# Patient Record
Sex: FEMALE | Race: WHITE
[De-identification: names, ages, dates, MRNs, and addresses within clinical notes are randomized per-mention and may not be internally consistent; named-entity substitution may affect disease eponyms.]

---

## 2017-08-03 ENCOUNTER — HOSPITAL ENCOUNTER (EMERGENCY)
Dept: HOSPITAL 74 - JER | Age: 37
LOS: 1 days | Discharge: HOME | End: 2017-08-04
Payer: COMMERCIAL

## 2017-08-03 VITALS — SYSTOLIC BLOOD PRESSURE: 147 MMHG | DIASTOLIC BLOOD PRESSURE: 87 MMHG | HEART RATE: 82 BPM | TEMPERATURE: 98.3 F

## 2017-08-03 VITALS — BODY MASS INDEX: 28.3 KG/M2

## 2017-08-03 DIAGNOSIS — Z3A.01: ICD-10-CM

## 2017-08-03 DIAGNOSIS — O26.891: Primary | ICD-10-CM

## 2017-08-03 DIAGNOSIS — R10.30: ICD-10-CM

## 2017-08-03 LAB
ALBUMIN SERPL-MCNC: 3.9 G/DL (ref 3.4–5)
ALP SERPL-CCNC: 82 U/L (ref 45–117)
ALT SERPL-CCNC: 35 U/L (ref 12–78)
ANION GAP SERPL CALC-SCNC: 6 MMOL/L (ref 8–16)
APPEARANCE UR: (no result)
AST SERPL-CCNC: 16 U/L (ref 15–37)
BASOPHILS # BLD: 0.3 % (ref 0–2)
BILIRUB SERPL-MCNC: 0.4 MG/DL (ref 0.2–1)
BILIRUB UR STRIP.AUTO-MCNC: NEGATIVE MG/DL
CALCIUM SERPL-MCNC: 8.9 MG/DL (ref 8.5–10.1)
CO2 SERPL-SCNC: 28 MMOL/L (ref 21–32)
COLOR UR: YELLOW
CREAT SERPL-MCNC: 0.6 MG/DL (ref 0.55–1.02)
DEPRECATED RDW RBC AUTO: 12.2 % (ref 11.6–15.6)
EOSINOPHIL # BLD: 1.3 % (ref 0–4.5)
GLUCOSE SERPL-MCNC: 99 MG/DL (ref 74–106)
HYALINE CASTS URNS QL MICRO: 4 /LPF
KETONES UR QL STRIP: NEGATIVE
LEUKOCYTE ESTERASE UR QL STRIP.AUTO: (no result)
MCH RBC QN AUTO: 28.9 PG (ref 25.7–33.7)
MCHC RBC AUTO-ENTMCNC: 33.3 G/DL (ref 32–36)
MCV RBC: 86.7 FL (ref 80–96)
MUCOUS THREADS URNS QL MICRO: (no result)
NEUTROPHILS # BLD: 70 % (ref 42.8–82.8)
NITRITE UR QL STRIP: NEGATIVE
PH UR: 7 [PH] (ref 5–8)
PLATELET # BLD AUTO: 245 K/MM3 (ref 134–434)
PMV BLD: 8 FL (ref 7.5–11.1)
PROT SERPL-MCNC: 7.3 G/DL (ref 6.4–8.2)
PROT UR QL STRIP: NEGATIVE
PROT UR QL STRIP: NEGATIVE
RBC # BLD AUTO: 1 /HPF (ref 0–3)
RBC # UR STRIP: NEGATIVE /UL
UROBILINOGEN UR STRIP-MCNC: (no result) MG/DL (ref 0.2–1)
WBC # BLD AUTO: 9.5 K/MM3 (ref 4–10)
WBC # UR AUTO: 10 /HPF (ref 3–5)

## 2017-08-03 NOTE — PDOC
History of Present Illness





- General


History Source: Patient


Exam Limitations: No Limitations





- History of Present Illness


Initial Comments: 


This is a 36 yo  (about 4 weeks pregnant) female with h/o ectopic pregnancy 

this past April (treated with methotrexate x2), who presents with right lower 

abdominal pain for the past 3 days. It is radiating to the right lower back, 

fluctuating, up to 8/10 severity, and feels like a cramp. She has taken no 

medications for her symptoms. She was seen at the office of her OB doctor (Dr. Humphreys) on Tuesday, at which time she had an ultrasound which did not show any 

fetal sac, fetal pole, or yolk sac within the uterus. She then had a follow-up 

hCG today, was told the level was high, and was instructed to go to the ED to 

rule out ectopic pregnancy. She notes recent clear vaginal discharge and chills

, but denies fever, vaginal bleeding, painful urination, fever, nausea, vomiting

, diarrhea, constipation, or other recent symptoms. She denies h/o STDs and has 

no concern for STD tonight.





<Carmela Dodd - Last Filed: 17 23:27>





<Hanna Lozoya - Last Filed: 17 01:26>





- General


Chief Complaint: Pain


Stated Complaint: PCP SENT/EVALUATION


Time Seen by Provider: 17 21:18





Past History





- Psycho/Social/Smoking Cessation Hx


Suicidal Ideation: No


Smoking History: Never smoked


Information on smoking cessation initiated: No


Hx Alcohol Use: No


Drug/Substance Use Hx: No


Substance Use Type: None





<Carmela Dodd - Last Filed: 17 23:27>





<Hanna Lozoya - Last Filed: 17 01:26>





- Past Medical History


Allergies/Adverse Reactions: 


 Allergies











Allergy/AdvReac Type Severity Reaction Status Date / Time


 


No Known Allergies Allergy   Verified 17 18:46











Home Medications: 


Ambulatory Orders





Pantoprazole Sodium [Protonix -] 40 mg PO DAILY #14 tablet.ec 04/10/16 


Nitrofurantoin Monohyd/M-Cryst [Macrobid -] 100 mg PO BID #14 capsule 17 











**Review of Systems





- Review of Systems


Constitutional: Yes: Chills.  No: Fever, Unexplained wgt Loss


HEENTM: No: Nose Congestion, Throat Pain


Respiratory: No: Cough, Shortness of Breath


Cardiac (ROS): No: Chest Pain, Palpitations


ABD/GI: Yes: Abdominal cramping.  No: Constipated, Diarrhea, Nausea, Vomiting


: Yes: Other (clear vaginal discharge).  No: Burning, Dysuria


Musculoskeletal: No: Back Pain, Neck Pain


Integumentary: No: Bruising, Rash


Neurological: No: Headache, Numbness, Tingling, Weakness, Dizziness


Endocrine: No: Unexplained Weight Gain, Unexplained Weight Loss





<Carmela Dodd - Last Filed: 17 23:27>





*Physical Exam





- Vital Signs


 Last Vital Signs











Temp Pulse Resp BP Pulse Ox


 


 98.3 F   82   18   147/87   100 


 


 17 18:44  17 18:44  17 18:44  17 18:44  17 18:44














17 22:30


Blood pressure re-checked suring examination to be 110/80.





- Physical Exam


General Appearance: Yes: Nourished, Appropriately Dressed, Other (well 

appearing and nontoxic appearing, conversive, pleasant).  No: Apparent Distress


HEENT: positive: EOMI, Normal Voice, Hearing Grossly Normal.  negative: Scleral 

Icterus (R), Scleral Icterus (L), Nasal Congestion


Neck: positive: Trachea midline, Supple.  negative: Tender, Rigid


Respiratory/Chest: positive: Lungs Clear, Normal Breath Sounds.  negative: 

Respiratory Distress, Crackles, Rhonchi, Stridor, Wheezing


Cardiovascular: positive: Regular Rhythm, Regular Rate.  negative: Edema, Murmur


Female Pelvic Exam: positive: normal external exam, cervical os closed, 

discharge (moderate thin white non-odorous discharge), adnexal tenderness (

right only).  negative: CMT, lesions, vaginal bleeding


Gastrointestinal/Abdominal: positive: Normal Bowel Sounds, Tender (mild RLQ), 

Soft.  negative: Organomegaly, Pulsatile Mass, Guarding


Musculoskeletal: positive: Normal Inspection.  negative: Decreased Range of 

Motion, Vertebral Tenderness


Extremity: positive: Normal Capillary Refill, Normal Inspection, Normal Range 

of Motion.  negative: Tender, Cyanosis


Integumentary: positive: Normal Color, Dry, Warm.  negative: Erythema, Rash, 

Bruising


Neurologic: positive: CNs II-XII NML intact (grossly), Fully Oriented, Alert, 

Normal Mood/Affect, Normal Response, Motor Strength 5/5





<Carmela Dodd - Last Filed: 17 23:27>





- Vital Signs


 Last Vital Signs











Temp Pulse Resp BP Pulse Ox


 


 98.3 F   82   18   147/87   100 


 


 17 18:44  17 18:44  17 18:44  17 18:44  17 18:44














<Hanna Lozoya - Last Filed: 17 01:26>





ED Treatment Course





- LABORATORY


CBC & Chemistry Diagram: 


 17 22:12





 17 22:12





<Carmela Dodd - Last Filed: 17 23:27>





- LABORATORY


CBC & Chemistry Diagram: 


 17 22:12





 17 22:12





- ADDITIONAL ORDERS


Additional order review: 


 Laboratory  Results











  17





  22:12 22:12 22:12


 


Sodium    138


 


Potassium    3.6


 


Chloride    104


 


Carbon Dioxide    28


 


Anion Gap    6 L


 


BUN    9


 


Creatinine    0.6  D


 


Creat Clearance w eGFR    > 60


 


Random Glucose    99


 


Calcium    8.9


 


Total Bilirubin    0.4  D


 


AST    16


 


ALT    35  D


 


Alkaline Phosphatase    82


 


Total Protein    7.3


 


Albumin    3.9


 


Beta HCG, Quant   2043.7 


 


Urine Color   


 


Urine Appearance   


 


Urine pH   


 


Urine Protein   


 


Urine Glucose (UA)   


 


Urine Ketones   


 


Urine Blood   


 


Urine Nitrite   


 


Urine Bilirubin   


 


Urine Urobilinogen   


 


Ur Leukocyte Esterase   


 


Urine RBC   


 


Urine WBC   


 


Ur Epithelial Cells   


 


Hyaline Casts   


 


Urine Mucus   


 


Urine HCG, Qual   


 


Blood Type  O POSITIVE  


 


Antibody Screen  Negative  














  17





  22:04 21:55


 


Sodium  


 


Potassium  


 


Chloride  


 


Carbon Dioxide  


 


Anion Gap  


 


BUN  


 


Creatinine  


 


Creat Clearance w eGFR  


 


Random Glucose  


 


Calcium  


 


Total Bilirubin  


 


AST  


 


ALT  


 


Alkaline Phosphatase  


 


Total Protein  


 


Albumin  


 


Beta HCG, Quant  


 


Urine Color  Yellow 


 


Urine Appearance  Slcloudy 


 


Urine pH  7.0 


 


Urine Protein  Negative 


 


Urine Glucose (UA)  Negative 


 


Urine Ketones  Negative 


 


Urine Blood  Negative 


 


Urine Nitrite  Negative 


 


Urine Bilirubin  Negative 


 


Urine Urobilinogen  4.0 e.u/dl H 


 


Ur Leukocyte Esterase  3+ H 


 


Urine RBC  1 


 


Urine WBC  10 


 


Ur Epithelial Cells  Few 


 


Hyaline Casts  4 


 


Urine Mucus  Rare 


 


Urine HCG, Qual   Positive


 


Blood Type  


 


Antibody Screen  








 











  17





  22:12


 


RBC  4.53


 


MCV  86.7


 


MCHC  33.3


 


RDW  12.2


 


MPV  8.0


 


Neutrophils %  70.0


 


Lymphocytes %  22.4  D


 


Monocytes %  6.0


 


Eosinophils %  1.3


 


Basophils %  0.3














- RADIOLOGY


Radiology Studies Ordered: 














 Category Date Time Status


 


 PELVIS(OTHER) US [US] Stat Ultrasound  17 00:25 Taken


 


 TRANSVAGINAL ULTRASOUND US [US] Stat Ultrasound  17 21:40 Completed














<Hanna Lozoya - Last Filed: 17 01:26>





Medical Decision Making





- Medical Decision Making


This is a 36 yo  (4 wk pregnant) female with h/o prior ectopic who p/w RLQ 

pain.


US in OB's office on Tuesday was not able to visualize IUP.


Beta-hCG today in OB office was high.


Pt was instructed to come to the ED to r/o ectopic pregnancy.


Exam notable for RLQ ttp.


Pelvic with moderate thin white discharge, os closed, right adnexal tenderness, 

no CMT.


DDX includes ectopic pregnancy, appendicitis, round ligament pain, UTI.


Ordered are CBC, CMP, lipase, quant beta-hCG, UA/cx, US transvaginal.





17 23:27


UA with 3+ leukocyte esterase.


CBC unremarkable.


Quantitative beta-hCG is 2043.7.


Pt is sent for transvaginal US.





17 23:33








<Carmela Dodd - Last Filed: 17 23:27>





*DC/Admit/Observation/Transfer





<Carmela Dodd - Last Filed: 17 23:27>





<Hanna Lozoya - Last Filed: 17 01:26>


Diagnosis at time of Disposition: 


 Abdominal pain affecting pregnancy





- Prescriptions


Prescriptions: 


Nitrofurantoin Monohyd/M-Cryst [Macrobid -] 100 mg PO BID #14 capsule





- Referrals


Referrals: 


Davion Humphreys [Primary Care Provider] - 





- Patient Instructions


Printed Discharge Instructions:  DI for Acute Abdomen


Additional Instructions: 


Your ultrasound was inconclusive as the appendix was not visible. Please return 

at 6:30am for your MRI which is needed to rule out appendicitis. If your 

abdominal pain worsens, come back sooner.

## 2017-08-03 NOTE — PDOC
Attending Attestation





- HPI


HPI: 


The patient is a 36 yo F  with a past medical history significant for Hx of 

ectopic pregnancy who presents from OBs office complaining of 2-3 days of RLQ 

pain and positive HCG with no intrauterine pregnancy seen on US at her OBs 

office.  The patient states the pain radiates to her R lower back. She 

describes the pain as a cramping sensation. The patient states she went to her 

OBs office on Tuesday and had her US. Today, the patient tested her HCG in 

office today and found it to be elevated. Her OB sent her to the ED for 

possible ectopic pregnancy. 8/10. Radiates to RL back. Cramping. She states her 

LMP was . She notes shes been having clear vaginal discharge and 

subjective chills. Denies fevers, nausea, vomiting, diarrhea. Denies vaginal 

bleeding. Denies hematuria, frequency, urgency and dysuria. She states her last 

ectopic felt different in that it was nonstop bleeding and more painful.  She 

also experienced nausea and vomiting last time. Last BM was this morning. 





PCP: Dr. Echols








- Physicial Exam


PE: 


GENERAL: Awake, alert, and fully oriented, in no acute distress


HEAD: No signs of trauma


EYES: PERRLA, EOMI, sclera anicteric, conjunctiva clear


ENT: Auricles normal inspection, hearing grossly normal, nares patent, 

oropharynx clear without exudates. Moist mucosa


NECK: Normal ROM, supple, no lymphadenopathy, JVD, or masses


LUNGS: Breath sounds equal, clear to auscultation bilaterally.  No wheezes, and 

no crackles


HEART: Regular rate and rhythm, normal S1 and S2, no murmurs, rubs or gallops


ABDOMEN: Soft, RLQ tenderness to palpation, normoactive bowel sounds.  No 

guarding, no rebound.  No masses


EXTREMITIES: Normal range of motion, no edema.  No clubbing or cyanosis. No 

cords, erythema, or tenderness


NEUROLOGICAL: Cranial nerves II through XII grossly intact.  Normal speech, 

normal gait


SKIN: Warm, Dry, normal turgor, no rashes or lesions noted.








- Medical Decision Making


Pelvic US:


FINDINGS: The appendix is not visualized. However, there is excess bowel gas 

limiting the scan. Therefore, nonvisualization of the appendix does not exclude 

appendicitis and if appendicitis is suspected, further evaluation is 

recommended.





Documentation prepared by Christal Mixon, acting as medical scribe for Hanna Lozoya MD, MD/DO.











<Christal Mixon - Last Filed: 17 00:52>





- Resident


Resident Name: Carmela Dodd





- Medical Decision Making


17 23:00


38yo F currently 4 weeks pregnant by dates, hx ectopic pregnancy p/w 2 days of 

RLQ pain and sent in to ED by her OB 2/2 "elevated HCG." Pt also reports that 

an US on Tuesday did not show an intrauterine pregnancy. Here, her exam is 

remarkable for significant RLQ ttp but no rebound or guarding and mild R 

adnexal ttp on pelvic exam. Concern for ectopic vs appendicitis. TVUS thus far 

negative for ectopic pregnancy, and shows no R ovarian pathology. In addition, 

the US shows an IUP, On reexamination, pt remains tender in the RLQ. Spoke with 

radiology who recommends a pelvic US to see if the appendix can be visualized. 

Remainder of plan:


-labs including CBC, T&S, and HCG quant


-UA


-reassess





17 23:05


Initial 's systolic. On repeat /72, and when repeated 30 mins later 

was 104/68. UA with no protein. UA with 3+ LE, pt is asymptomatic. Will give pt 

nitrofurantoin in case of asymptomatic bacturia. 





17 01:00


Pelvic US does not visualize the appendix. Since an MRI will be needed to r/o 

appendicitis, we offered to patient admission for monitoring as well as 

consultation of the OB/GYN team in addition to an MRI in the morning. Pt does 

not want to stay in the hospital and prefers to come back in the morning for 

MRI. I discussed the possibility of her condition getting worse, especially if 

there is an appendicitis and that we could monitor her in the ED but she 

declines to stay. Vitals are stable, she is not in any distress. She agrees to 

return in the morning for MRI or at any point tonight if she changes her mind 

or she has any symptoms of worsening pain, distended abdomen, bleeding, CP, or 

any other concerning symptoms.


-DC











17 03:48








<Hanna Lozoya - Last Filed: 17 03:48>

## 2017-08-04 ENCOUNTER — HOSPITAL ENCOUNTER (EMERGENCY)
Dept: HOSPITAL 74 - JER | Age: 37
Discharge: HOME | End: 2017-08-04
Payer: COMMERCIAL

## 2017-08-04 VITALS — BODY MASS INDEX: 28.3 KG/M2

## 2017-08-04 VITALS — SYSTOLIC BLOOD PRESSURE: 107 MMHG | DIASTOLIC BLOOD PRESSURE: 66 MMHG | HEART RATE: 93 BPM

## 2017-08-04 VITALS — TEMPERATURE: 98.1 F

## 2017-08-04 DIAGNOSIS — R10.13: Primary | ICD-10-CM

## 2017-08-04 LAB
ANION GAP SERPL CALC-SCNC: 8 MMOL/L (ref 8–16)
BASOPHILS # BLD: 0.3 % (ref 0–2)
CALCIUM SERPL-MCNC: 8.8 MG/DL (ref 8.5–10.1)
CO2 SERPL-SCNC: 25 MMOL/L (ref 21–32)
CREAT SERPL-MCNC: 0.5 MG/DL (ref 0.55–1.02)
DEPRECATED RDW RBC AUTO: 12 % (ref 11.6–15.6)
EOSINOPHIL # BLD: 1 % (ref 0–4.5)
GLUCOSE SERPL-MCNC: 89 MG/DL (ref 74–106)
MCH RBC QN AUTO: 29.2 PG (ref 25.7–33.7)
MCHC RBC AUTO-ENTMCNC: 33.9 G/DL (ref 32–36)
MCV RBC: 86.2 FL (ref 80–96)
NEUTROPHILS # BLD: 76 % (ref 42.8–82.8)
PLATELET # BLD AUTO: 228 K/MM3 (ref 134–434)
PMV BLD: 8 FL (ref 7.5–11.1)
SP GR UR: 1.02 (ref 1–1.02)
WBC # BLD AUTO: 8.1 K/MM3 (ref 4–10)

## 2017-08-04 NOTE — PDOC
Attending Attestation





- Resident


Resident Name: Tereso Keller





- ED Attending Attestation


I have performed the following: I have examined & evaluated the patient, The 

case was reviewed & discussed with the resident, I agree w/resident's findings 

& plan, Exceptions are as noted





- HPI


HPI: 





08/04/17 09:25





37-year-old female approximate 5 weeks pregnant with no past medical history 

returns back for right lower quadrant pain. Patient was found to have a urinary 

tract infection and a transvaginal ultrasound demonstrated pregnancy at 4 weeks 

and 6 days but no fetal pole. Patient was given antibiotics and recommended for 

MRI. However, the patient did not want to stay and left and agree to return in 

the morning for an MRI. Denies fevers, nausea, vomiting, diarrhea.





- Physicial Exam


PE: 





08/04/17 09:33





GENERAL: Awake, alert, and fully oriented, in no acute distress. 


HEAD: No signs of trauma


EYES: PERRLA, EOMI, sclera anicteric, conjunctiva clear


ENT: Auricles normal inspection, hearing grossly normal, nares patent, 

oropharynx clear without exudates.  


NECK: Normal ROM, supple, no lymphadenopathy, JVD, or masses


LUNGS: Breath sounds equal, clear to auscultation bilaterally.  No wheezes, and 

no crackles


HEART: Regular rate and rhythm, normal S1 and S2, no murmurs, rubs or gallops


ABDOMEN: TTP RLQ. Soft, normoactive bowel sounds.  No guarding, no rebound.  No 

masses


EXTREMITIES: Normal range of motion, no edema.  No clubbing or cyanosis. No 

cords, erythema, or tenderness


NEUROLOGICAL: Cranial nerves II through XII grossly intact.  Normal speech, 

normal gait


SKIN: Warm, Dry, normal turgor, no rashes or lesions noted.





- Medical Decision Making


08/04/17 09:33





 Vital Signs











Temp Pulse Resp BP Pulse Ox


 


 98.1 F   73   18   131/78   100 


 


 08/04/17 07:55  08/04/17 07:55  08/04/17 07:55  08/04/17 07:55  08/04/17 07:55








Repeat beta. I agree reply with MRI. Abdomen pelvis to rule out appendicitis. 





08/04/17 13:04





 CBC, BMP





 08/04/17 09:09 





 08/04/17 09:09 





 CMP











Sodium  138 mmol/L (136-145)   08/04/17  09:09    


 


Potassium  3.4 mmol/L (3.5-5.1)  L  08/04/17  09:09    


 


Chloride  105 mmol/L ()   08/04/17  09:09    


 


Carbon Dioxide  25 mmol/L (21-32)   08/04/17  09:09    


 


Anion Gap  8  (8-16)   08/04/17  09:09    


 


BUN  7 mg/dL (7-18)  D 08/04/17  09:09    


 


Creatinine  0.5 mg/dL (0.55-1.02)  L  08/04/17  09:09    


 


Random Glucose  89 mg/dL ()   08/04/17  09:09    


 


Calcium  8.8 mg/dL (8.5-10.1)   08/04/17  09:09    








MRI reviewed. No appendicitis.


Likely cystitis.

## 2017-08-04 NOTE — PDOC
*Physical Exam





- Vital Signs


Pt's course was signed out to me by Dr. Kaba. Pt is a 37F , who is 4 

weeks pregnant, presenting with RLQ abdominal pain. Pt has no fever, no 

leukocytosis, no vaginal bleeding. transvaginal US showed intrauterine 

pregnancy. Pelvic exam demonstrated adnexal tenderness. US vs. MRI to r/o 

appendicitis. Pt's UA consistent with UTI, pt is asymptomatic, treated with 

abx. 











 Last Vital Signs











Temp Pulse Resp BP Pulse Ox


 


 98.3 F   82   18   147/87   100 


 


 17 18:44  17 18:44  17 18:44  17 18:44  17 18:44














17 00:26








ED Treatment Course





- LABORATORY


CBC & Chemistry Diagram: 


 17 22:12





 17 22:12





- ADDITIONAL ORDERS


Additional order review: 


 Laboratory  Results











  17





  22:12 22:12 22:12


 


Sodium    138


 


Potassium    3.6


 


Chloride    104


 


Carbon Dioxide    28


 


Anion Gap    6 L


 


BUN    9


 


Creatinine    0.6  D


 


Creat Clearance w eGFR    > 60


 


Random Glucose    99


 


Calcium    8.9


 


Total Bilirubin    0.4  D


 


AST    16


 


ALT    35  D


 


Alkaline Phosphatase    82


 


Total Protein    7.3


 


Albumin    3.9


 


Beta HCG, Quant   2043.7 


 


Urine Color   


 


Urine Appearance   


 


Urine pH   


 


Urine Protein   


 


Urine Glucose (UA)   


 


Urine Ketones   


 


Urine Blood   


 


Urine Nitrite   


 


Urine Bilirubin   


 


Urine Urobilinogen   


 


Ur Leukocyte Esterase   


 


Urine RBC   


 


Urine WBC   


 


Ur Epithelial Cells   


 


Hyaline Casts   


 


Urine Mucus   


 


Urine HCG, Qual   


 


Blood Type  O POSITIVE  


 


Antibody Screen  Negative  














  17





  22:04 21:55


 


Sodium  


 


Potassium  


 


Chloride  


 


Carbon Dioxide  


 


Anion Gap  


 


BUN  


 


Creatinine  


 


Creat Clearance w eGFR  


 


Random Glucose  


 


Calcium  


 


Total Bilirubin  


 


AST  


 


ALT  


 


Alkaline Phosphatase  


 


Total Protein  


 


Albumin  


 


Beta HCG, Quant  


 


Urine Color  Yellow 


 


Urine Appearance  Slcloudy 


 


Urine pH  7.0 


 


Urine Protein  Negative 


 


Urine Glucose (UA)  Negative 


 


Urine Ketones  Negative 


 


Urine Blood  Negative 


 


Urine Nitrite  Negative 


 


Urine Bilirubin  Negative 


 


Urine Urobilinogen  4.0 e.u/dl H 


 


Ur Leukocyte Esterase  3+ H 


 


Urine RBC  1 


 


Urine WBC  10 


 


Ur Epithelial Cells  Few 


 


Hyaline Casts  4 


 


Urine Mucus  Rare 


 


Urine HCG, Qual   Positive


 


Blood Type  


 


Antibody Screen  








 











  17





  22:12


 


RBC  4.53


 


MCV  86.7


 


MCHC  33.3


 


RDW  12.2


 


MPV  8.0


 


Neutrophils %  70.0


 


Lymphocytes %  22.4  D


 


Monocytes %  6.0


 


Eosinophils %  1.3


 


Basophils %  0.3














*DC/Admit/Observation/Transfer


Diagnosis at time of Disposition: 


 Abdominal pain affecting pregnancy





- Discharge Dispostion


Disposition: HOME


Admit: No





- Prescriptions


Prescriptions: 


Nitrofurantoin Monohyd/M-Cryst [Macrobid -] 100 mg PO BID #14 capsule





- Patient Instructions


Printed Discharge Instructions:  DI for Acute Abdomen


Additional Instructions: 


Your ultrasound was inconclusive as the appendix was not visible. Please return 

at 6:30am for your MRI which is needed to rule out appendicitis. If your 

abdominal pain worsens, come back sooner.





- Attestations


Physician Attestion: 





17 01:09








I, Dr. Gilberto Reyna, attest that this document has been prepared under my 

direction and personally reviewed by me in its entirety.   I further attest, 

that it accurately reflects all work, treatment, procedures and medical decision

-making performed by me.

## 2017-08-04 NOTE — PDOC
History of Present Illness





- General


Chief Complaint: Pain, Acute


Stated Complaint: REVISIT


Time Seen by Provider: 17 08:09





- History of Present Illness


Initial Comments: 


17 08:09


Ms. Brunner is a 38 yo female  with a significant past medical history of 

ectopic pregnancy who presented to the emergency department from OB's office 

yesterday with 2-3 days of RLQ pain. She was referred to ER by OB after 

positive HCG w/out intrauterine pregnancy seen on US at OB's office. She says 

the pain radiates to her lower back (R) and that it is crampy. She describes 

her pain as 9/10. LMP , she notes she has been having clear vaginal 

discharge w/ subjective chills. She had a full workup last night which was able 

to confirm an intrauterine sac (no pole noted) with transvaginal US. Pelvic US 

was not able to r/o appendicitis and she was recommended for admission with 

MRI. Elected to go home and represents this AM for same.





The patient denies chest pain, shortness of breath, headache and dizziness. 

Denies fever, nausea, vomit, diarrhea and constipation. Denies dysuria, 

frequency, urgency and hematuria.





Allergies: NKDA


Past surgical history: None


Social history: Social EtOH


PMD - Dr. Wheat


OB/GYN - Dr. Tomlin











17 08:46








Past History





- Past Medical History


Allergies/Adverse Reactions: 


 Allergies











Allergy/AdvReac Type Severity Reaction Status Date / Time


 


No Known Allergies Allergy   Verified 17 07:55











Home Medications: 


Ambulatory Orders





Nitrofurantoin Monohyd/M-Cryst [Macrobid -] 100 mg PO BID #14 capsule 17 











- Immunization History


Immunization Up to Date: Yes





- Psycho/Social/Smoking Cessation Hx


Suicidal Ideation: No


Smoking History: Never smoked


Hx Alcohol Use: No


Drug/Substance Use Hx: No


Substance Use Type: None





**Review of Systems





- Review of Systems


Comments:: 


17 08:09


GENERAL/CONSTITUTIONAL: +Subjective chills. No fever. No weakness.


HEAD, EYES, EARS, NOSE AND THROAT: No change in vision. No ear pain or 

discharge. No sore throat.


CARDIOVASCULAR: No chest pain or shortness of breath


RESPIRATORY: No cough, wheezing, or hemoptysis.


GASTROINTESTINAL: +RLQ pain radiating to her back. No nausea, vomiting, 

diarrhea or constipation.


GENITOURINARY: No dysuria, frequency, or change in urination.


MUSCULOSKELETAL: No joint or muscle swelling or pain. No neck or back pain.


SKIN: No rash


NEUROLOGIC: No headache, vertigo, loss of consciousness, or change in strength/

sensation.


ENDOCRINE: No increased thirst. No abnormal weight change


HEMATOLOGIC/LYMPHATIC: No anemia, easy bleeding, or history of blood clots.


ALLERGIC/IMMUNOLOGIC: No hives or skin allergy.











*Physical Exam





- Vital Signs


 Last Vital Signs











Temp Pulse Resp BP Pulse Ox


 


 98.1 F   73   18   131/78   100 


 


 17 07:55  17 07:55  17 07:55  17 07:55  17 07:55














- Physical Exam


Comments: 


17 08:09


GENERAL: Awake, alert, and fully oriented, in no acute distress


HEAD: No signs of trauma, normocephalic, atraumatic


EYES: PERRLA, EOMI, sclera anicteric, conjunctiva clear


ENT: Auricles normal inspection, hearing grossly normal, nares patent, 

oropharynx clear without


exudates. Moist mucosa


NECK: Normal ROM, supple, no lymphadenopathy, JVD, or masses


LUNGS: No distress, speaks full sentences, clear to auscultation bilaterally


HEART: Regular rate and rhythm, normal S1 and S2, no murmurs, rubs or gallops, 

peripheral pulses normal and equal bilaterally.


ABDOMEN: +Achey and Tender in RLQ. No rebound. Soft, normoactive bowel sounds.  

No guarding, no rebound.  No masses


EXTREMITIES: Normal inspection, Normal range of motion, no edema.  No clubbing 

or cyanosis.


NEUROLOGICAL: Cranial nerves II through XII grossly intact.  Normal speech, 

normal gait, no focal sensorimotor deficits


SKIN: Warm, Dry, normal turgor, no rashes or lesions noted.








17 13:23








ED Treatment Course





- LABORATORY


CBC & Chemistry Diagram: 


 17 09:09





 17 09:09





Medical Decision Making





- Medical Decision Making


17 09:29


Ms. Brunner presents for F/U after leaving ED early this AM (0100) with 

suspected appendicitis.





MRI ordered to confirm.





17 13:22


MRI negative for appendicitis, labs unconcerning. Instructed patient to f/u 

outpatient with OB/GYN and continue ABX.











*DC/Admit/Observation/Transfer


Diagnosis at time of Disposition: 


 Epigastric abdominal pain





- Discharge Dispostion


Disposition: HOME


Condition at time of disposition: Stable





- Patient Instructions


Additional Instructions: 


Please return immediately if any increase in pain, fever, or other concerning 

symptoms.





- Attestations


Physician Attestion: 





17 13:23








I, Dr. Tereso Keller, attest that this document has been prepared under my 

direction and personally reviewed by me in its entirety.   I further attest, 

that it accurately reflects all work, treatment, procedures and medical decision

-making performed by me.

## 2017-12-16 ENCOUNTER — HOSPITAL ENCOUNTER (EMERGENCY)
Dept: HOSPITAL 74 - JER | Age: 37
Discharge: HOME | End: 2017-12-16
Payer: COMMERCIAL

## 2017-12-16 VITALS — BODY MASS INDEX: 29.2 KG/M2

## 2017-12-16 VITALS — TEMPERATURE: 98.4 F | HEART RATE: 103 BPM | SYSTOLIC BLOOD PRESSURE: 106 MMHG | DIASTOLIC BLOOD PRESSURE: 66 MMHG

## 2017-12-16 DIAGNOSIS — J06.9: ICD-10-CM

## 2017-12-16 DIAGNOSIS — B34.9: ICD-10-CM

## 2017-12-16 DIAGNOSIS — Z3A.24: ICD-10-CM

## 2017-12-16 DIAGNOSIS — O98.512: Primary | ICD-10-CM

## 2017-12-16 LAB
ALBUMIN SERPL-MCNC: 3.1 G/DL (ref 3.4–5)
ALP SERPL-CCNC: 84 U/L (ref 45–117)
ALT SERPL-CCNC: 39 U/L (ref 12–78)
ANION GAP SERPL CALC-SCNC: 10 MMOL/L (ref 8–16)
APPEARANCE UR: (no result)
AST SERPL-CCNC: 24 U/L (ref 15–37)
BACTERIA #/AREA URNS HPF: (no result) /HPF
BASOPHILS # BLD: 0.4 % (ref 0–2)
BILIRUB SERPL-MCNC: 0.3 MG/DL (ref 0.2–1)
BILIRUB UR STRIP.AUTO-MCNC: NEGATIVE MG/DL
CALCIUM SERPL-MCNC: 8.4 MG/DL (ref 8.5–10.1)
CK SERPL-CCNC: 48 IU/L (ref 26–192)
CO2 SERPL-SCNC: 24 MMOL/L (ref 21–32)
COLOR UR: YELLOW
CREAT SERPL-MCNC: 0.5 MG/DL (ref 0.55–1.02)
DEPRECATED RDW RBC AUTO: 13 % (ref 11.6–15.6)
EOSINOPHIL # BLD: 2.4 % (ref 0–4.5)
GLUCOSE SERPL-MCNC: 74 MG/DL (ref 74–106)
KETONES UR QL STRIP: (no result)
LEUKOCYTE ESTERASE UR QL STRIP.AUTO: (no result)
MCH RBC QN AUTO: 29.3 PG (ref 25.7–33.7)
MCHC RBC AUTO-ENTMCNC: 33.8 G/DL (ref 32–36)
MCV RBC: 86.7 FL (ref 80–96)
MUCOUS THREADS URNS QL MICRO: (no result)
NEUTROPHILS # BLD: 82.7 % (ref 42.8–82.8)
NITRITE UR QL STRIP: NEGATIVE
PH UR: 6 [PH] (ref 5–8)
PLATELET # BLD AUTO: 181 K/MM3 (ref 134–434)
PMV BLD: 9.2 FL (ref 7.5–11.1)
PROT SERPL-MCNC: 6.7 G/DL (ref 6.4–8.2)
PROT UR QL STRIP: (no result)
PROT UR QL STRIP: NEGATIVE
RBC # UR STRIP: NEGATIVE /UL
RBC #/AREA URNS HPF: <1 /HPF (ref 0–3)
SP GR UR: 1.02 (ref 1–1.03)
TROPONIN I SERPL-MCNC: < 0.02 NG/ML (ref 0–0.05)
UROBILINOGEN UR STRIP-MCNC: NEGATIVE MG/DL (ref 0.2–1)
WBC # BLD AUTO: 10.4 K/MM3 (ref 4–10)
WBC #/AREA URNS HPF: 1 /HPF (ref 3–5)

## 2017-12-16 PROCEDURE — 3E0337Z INTRODUCTION OF ELECTROLYTIC AND WATER BALANCE SUBSTANCE INTO PERIPHERAL VEIN, PERCUTANEOUS APPROACH: ICD-10-PCS | Performed by: EMERGENCY MEDICINE

## 2017-12-16 PROCEDURE — 3E033GC INTRODUCTION OF OTHER THERAPEUTIC SUBSTANCE INTO PERIPHERAL VEIN, PERCUTANEOUS APPROACH: ICD-10-PCS | Performed by: EMERGENCY MEDICINE

## 2017-12-16 PROCEDURE — 3E033NZ INTRODUCTION OF ANALGESICS, HYPNOTICS, SEDATIVES INTO PERIPHERAL VEIN, PERCUTANEOUS APPROACH: ICD-10-PCS | Performed by: EMERGENCY MEDICINE

## 2017-12-16 NOTE — PDOC
History of Present Illness





- General


Chief Complaint: Cold Symptoms


Stated Complaint: TROUBLE BREATHING/24 WKS PREG


Time Seen by Provider: 12/16/17 16:22





- History of Present Illness


Initial Comments: 





12/16/17 16:39


37 F @ 24 weeks presenting with cough and sore throat x 2 days. Pt denies F/C. 

Pt denies any significant chest pain or SOB other than when she has a coughing 

fit. At rest she currently denies CP/SOB. Denies pleuritic or exertional chest 

pain. Pt denies any asymmetric leg swelling. Endorses occasional nausea and 

vomiting, no diarrhea or abdominal pain. No dysuria. No vaginal bleeding or 

discharge.


Pt was seen at urgent care earlier today and had a negative flu swab and strep 

test. She was referred to ER due to tachycardia.


Pt states that her OB, Dr. Belle, instructed her to come to ER.


I spoke with Dr. Belle, who expressed his concern over pt's tachycardia in 

urgent care. He recommends non-stress testing after ED work up.








Past History





- Past Medical History


Allergies/Adverse Reactions: 


 Allergies











Allergy/AdvReac Type Severity Reaction Status Date / Time


 


No Known Allergies Allergy   Verified 12/16/17 15:08











Home Medications: 


Ambulatory Orders





Nitrofurantoin Monohyd/M-Cryst [Macrobid -] 100 mg PO BID #14 capsule 08/04/17 








COPD: No





- Immunization History


Immunization Up to Date: Yes





- Suicide/Smoking/Psychosocial Hx


Smoking History: Never smoked


Hx Alcohol Use: No


Drug/Substance Use Hx: No


Substance Use Type: None





**Review of Systems





- Review of Systems


Comments:: 





12/16/17 16:44


"GENERAL/CONSTITUTIONAL: No fever or chills. No weakness.


HEAD, EYES, EARS, NOSE AND THROAT: +sore throat. No change in vision. No ear 

pain or discharge.


CARDIOVASCULAR: No chest pain or shortness of breath.


RESPIRATORY: + cough, no wheezing, or hemoptysis.


GASTROINTESTINAL: No nausea, vomiting, diarrhea or constipation.


GENITOURINARY: No dysuria, frequency, or change in urination.


MUSCULOSKELETAL: No joint or muscle swelling or pain. No neck or back pain.


SKIN: No rash


NEUROLOGIC: No headache, vertigo, loss of consciousness, or change in strength/

sensation.


ENDOCRINE: No increased thirst. No abnormal weight change.


HEMATOLOGIC/LYMPHATIC: No anemia, easy bleeding, or history of blood clots.


ALLERGIC/IMMUNOLOGIC: No hives or skin allergy.


"





*Physical Exam





- Vital Signs


 Last Vital Signs











Temp Pulse Resp BP Pulse Ox


 


 98.9 F   124 H  20   117/86   98 


 


 12/16/17 15:05  12/16/17 15:05  12/16/17 15:05  12/16/17 15:05  12/16/17 15:05














- Physical Exam


Comments: 





12/16/17 16:44


"GENERAL: Awake, alert, and fully oriented, in no acute distress


HEAD: No signs of trauma


EYES: PERRLA, EOMI, sclera anicteric, conjunctiva clear


ENT: Auricles normal inspection, hearing grossly normal, nares patent, 

oropharynx clear without exudates. Moist mucosa


NECK: Nontender, no stepoffs, Normal ROM, supple, no lymphadenopathy, JVD, or 

masses


LUNGS: Breath sounds equal, clear to auscultation bilaterally.  No wheezes, and 

no crackles


HEART: Regular rate and rhythm, normal S1 and S2, no murmurs, rubs or gallops


ABDOMEN: Soft, gravid, nontender, normoactive bowel sounds.  No guarding, no 

rebound.  No masses


EXTREMITIES: Normal range of motion, no edema.  No clubbing or cyanosis. No 

cords, erythema, or tenderness


NEUROLOGICAL: Cranial nerves II through XII intact. 5/5 strength and sensation 

in all extremities, Normal speech, normal gait


SKIN: Warm, Dry, normal turgor, no rashes or lesions noted.


"





**Heart Score/ECG Review





- ECG Impressions


Comment:: 





12/16/17 17:16


NSR, no NEEL/STDs, , intervals wnl, axis wnl





ED Treatment Course





- LABORATORY


CBC & Chemistry Diagram: 


 12/16/17 17:05





 12/16/17 17:05





Medical Decision Making





- Medical Decision Making





12/16/17 16:45


37 F @ 24 weeks presenting with URI-like symptoms, sent from urgent care to ER 

for tachycardia. Pt with  in ER. Pt with no clinical signs or symptoms of 

DVT and no pleuritic CP or SOB. Pt likely volume depleted 2/2 viral illness and 

vomiting.


- IVF, zofran, tylenol


- Reassess, repeat vitals





12/16/17 18:12


CBC,CMP











WBC  10.4 K/mm3 (4.0-10.0)  H  12/16/17  17:05    


 


RBC  4.28 M/mm3 (3.60-5.2)   12/16/17  17:05    


 


Hgb  12.5 GM/dL (10.7-15.3)   12/16/17  17:05    


 


Hct  37.1 % (32.4-45.2)   12/16/17  17:05    


 


MCV  86.7 fl (80-96)   12/16/17  17:05    


 


MCH  29.3 pg (25.7-33.7)   12/16/17  17:05    


 


MCHC  33.8 g/dl (32.0-36.0)   12/16/17  17:05    


 


RDW  13.0 % (11.6-15.6)   12/16/17  17:05    


 


Plt Count  181 K/MM3 (134-434)  D 12/16/17  17:05    


 


MPV  9.2 fl (7.5-11.1)  D 12/16/17  17:05    


 


Neutrophils %  82.7 % (42.8-82.8)   12/16/17  17:05    


 


Lymphocytes %  8.6 % (8-40)  D 12/16/17  17:05    


 


Monocytes %  5.9 % (3.8-10.2)   12/16/17  17:05    


 


Eosinophils %  2.4 % (0-4.5)  D 12/16/17  17:05    


 


Basophils %  0.4 % (0-2.0)   12/16/17  17:05    


 


Sodium  137 mmol/L (136-145)   12/16/17  17:05    


 


Potassium  3.4 mmol/L (3.5-5.1)  L  12/16/17  17:05    


 


Chloride  103 mmol/L ()   12/16/17  17:05    


 


Carbon Dioxide  24 mmol/L (21-32)   12/16/17  17:05    


 


Anion Gap  10  (8-16)   12/16/17  17:05    


 


BUN  5 mg/dL (7-18)  L D 12/16/17  17:05    


 


Creatinine  0.5 mg/dL (0.55-1.02)  L  12/16/17  17:05    


 


Creat Clearance w eGFR  > 60  (>60)   12/16/17  17:05    


 


Random Glucose  74 mg/dL ()   12/16/17  17:05    


 


Calcium  8.4 mg/dL (8.5-10.1)  L  12/16/17  17:05    


 


Total Bilirubin  0.3 mg/dL (0.2-1.0)  D 12/16/17  17:05    


 


AST  24 U/L (15-37)  D 12/16/17  17:05    


 


ALT  39 U/L (12-78)   12/16/17  17:05    


 


Alkaline Phosphatase  84 U/L ()   12/16/17  17:05    


 


Creatine Kinase  48 IU/L ()   12/16/17  17:05    


 


Troponin I  < 0.02 ng/ml (0.00-0.05)   12/16/17  17:05    


 


Total Protein  6.7 g/dl (6.4-8.2)   12/16/17  17:05    


 


Albumin  3.1 g/dl (3.4-5.0)  L D 12/16/17  17:05    


 


Lipase  221 U/L ()   12/16/17  17:05    











Pt reassessed - now has HR <100. Pt able to tolerate PO. Denies any chest pain 

or shortness of breath.





Pt is well appearing with normal vitals. Clinically stable for DC at this time.


Will send pt to L&D for fetal heart monitoring.





*DC/Admit/Observation/Transfer


Diagnosis at time of Disposition: 


 Viral syndrome








- Discharge Dispostion


Disposition: HOME





- Referrals


Referrals: 


Jayson Solorio MD [Primary Care Provider] - 





- Patient Instructions


Printed Discharge Instructions:  DI for Viral Upper Respiratory Infection -- 

Adult


Additional Instructions: 


Please go directly to the labor and delivery floor for an evaluation of your 

baby.


You likely have a viral illness. However, if you experience worsening symptoms, 

symptoms persisting more than 4 days, chest pain, difficulty breathing, or any 

other concerning symptoms, return to the ER immediately.


Otherwise follow up with Dr. Belle this week for a re-evaluation.





- Post Discharge Activity





- Attestations


Physician Attestion: 





12/16/17 18:14








I, Dr. Miguel Mclaughlin MD, attest that this document has been prepared under my 

direction and personally reviewed by me in its entirety.   I further attest, 

that it accurately reflects all work, treatment, procedures and medical decision

-making performed by me.

## 2017-12-19 NOTE — EKG
Test Reason : 

Blood Pressure : ***/*** mmHG

Vent. Rate : 100 BPM     Atrial Rate : 100 BPM

   P-R Int : 128 ms          QRS Dur : 078 ms

    QT Int : 360 ms       P-R-T Axes : 026 070 011 degrees

   QTc Int : 464 ms

 

NORMAL SINUS RHYTHM

NORMAL ECG

NO PREVIOUS ECGS AVAILABLE

Confirmed by SHREYAS GOODWIN MD ([id number]) on 12/19/2017 1:40:42 AM

 

Referred By:             Confirmed By:SHREYAS GOODWIN MD

## 2020-11-10 ENCOUNTER — HOSPITAL ENCOUNTER (INPATIENT)
Dept: HOSPITAL 74 - JER | Age: 40
LOS: 2 days | Discharge: HOME | DRG: 263 | End: 2020-11-12
Attending: FAMILY MEDICINE | Admitting: GENERAL ACUTE CARE HOSPITAL
Payer: COMMERCIAL

## 2020-11-10 VITALS — BODY MASS INDEX: 27.3 KG/M2

## 2020-11-10 DIAGNOSIS — K80.00: Primary | ICD-10-CM

## 2020-11-10 DIAGNOSIS — K76.0: ICD-10-CM

## 2020-11-10 LAB
ALBUMIN SERPL-MCNC: 4 G/DL (ref 3.4–5)
ALP SERPL-CCNC: 99 U/L (ref 45–117)
ALT SERPL-CCNC: 35 U/L (ref 13–61)
ANION GAP SERPL CALC-SCNC: 6 MMOL/L (ref 8–16)
APPEARANCE UR: CLEAR
APTT BLD: 33.9 SECONDS (ref 25.2–36.5)
AST SERPL-CCNC: 15 U/L (ref 15–37)
BACTERIA # UR AUTO: 119 /UL (ref 0–1359)
BASOPHILS # BLD: 0.7 % (ref 0–2)
BILIRUB SERPL-MCNC: 0.2 MG/DL (ref 0.2–1)
BILIRUB UR STRIP.AUTO-MCNC: NEGATIVE MG/DL
BUN SERPL-MCNC: 9.5 MG/DL (ref 7–18)
CALCIUM SERPL-MCNC: 9.2 MG/DL (ref 8.5–10.1)
CASTS URNS QL MICRO: 1 /UL (ref 0–3.1)
CHLORIDE SERPL-SCNC: 107 MMOL/L (ref 98–107)
CO2 SERPL-SCNC: 26 MMOL/L (ref 21–32)
COLOR UR: (no result)
CREAT SERPL-MCNC: 0.6 MG/DL (ref 0.55–1.3)
DEPRECATED RDW RBC AUTO: 12.1 % (ref 11.6–15.6)
EOSINOPHIL # BLD: 2.2 % (ref 0–4.5)
EPITH CASTS URNS QL MICRO: 21 /UL (ref 0–25.1)
GLUCOSE SERPL-MCNC: 107 MG/DL (ref 74–106)
HCT VFR BLD CALC: 39.4 % (ref 32.4–45.2)
HGB BLD-MCNC: 13.1 GM/DL (ref 10.7–15.3)
INR BLD: 1.19 (ref 0.83–1.09)
KETONES UR QL STRIP: NEGATIVE
LEUKOCYTE ESTERASE UR QL STRIP.AUTO: (no result)
LIPASE SERPL-CCNC: 249 U/L (ref 73–393)
LYMPHOCYTES # BLD: 22.3 % (ref 8–40)
MCH RBC QN AUTO: 28.2 PG (ref 25.7–33.7)
MCHC RBC AUTO-ENTMCNC: 33.1 G/DL (ref 32–36)
MCV RBC: 85.2 FL (ref 80–96)
MONOCYTES # BLD AUTO: 5.2 % (ref 3.8–10.2)
NEUTROPHILS # BLD: 69.6 % (ref 42.8–82.8)
NITRITE UR QL STRIP: NEGATIVE
PH UR: 7.5 [PH] (ref 5–8)
PLATELET # BLD AUTO: 299 K/MM3 (ref 134–434)
PMV BLD: 7.4 FL (ref 7.5–11.1)
POTASSIUM SERPLBLD-SCNC: 4 MMOL/L (ref 3.5–5.1)
PROT SERPL-MCNC: 7.6 G/DL (ref 6.4–8.2)
PROT UR QL STRIP: (no result)
PROT UR QL STRIP: NEGATIVE
PT PNL PPP: 14.3 SEC (ref 9.7–13)
RBC # BLD AUTO: 4.62 M/MM3 (ref 3.6–5.2)
RBC # BLD AUTO: 4077 /UL (ref 0–23.9)
SODIUM SERPL-SCNC: 138 MMOL/L (ref 136–145)
SP GR UR: 1.02 (ref 1.01–1.03)
UROBILINOGEN UR STRIP-MCNC: 0.2 MG/DL (ref 0.2–1)
WBC # BLD AUTO: 7.6 K/MM3 (ref 4–10)
WBC # UR AUTO: 66 /UL (ref 0–25.8)

## 2020-11-10 PROCEDURE — C9803 HOPD COVID-19 SPEC COLLECT: HCPCS

## 2020-11-10 PROCEDURE — U0003 INFECTIOUS AGENT DETECTION BY NUCLEIC ACID (DNA OR RNA); SEVERE ACUTE RESPIRATORY SYNDROME CORONAVIRUS 2 (SARS-COV-2) (CORONAVIRUS DISEASE [COVID-19]), AMPLIFIED PROBE TECHNIQUE, MAKING USE OF HIGH THROUGHPUT TECHNOLOGIES AS DESCRIBED BY CMS-2020-01-R: HCPCS

## 2020-11-11 LAB
ALBUMIN SERPL-MCNC: 3.4 G/DL (ref 3.4–5)
ALP SERPL-CCNC: 76 U/L (ref 45–117)
ALT SERPL-CCNC: 30 U/L (ref 13–61)
ANION GAP SERPL CALC-SCNC: 6 MMOL/L (ref 8–16)
AST SERPL-CCNC: 16 U/L (ref 15–37)
BASOPHILS # BLD: 0.7 % (ref 0–2)
BILIRUB SERPL-MCNC: 0.6 MG/DL (ref 0.2–1)
BUN SERPL-MCNC: 4.4 MG/DL (ref 7–18)
CALCIUM SERPL-MCNC: 8.7 MG/DL (ref 8.5–10.1)
CHLORIDE SERPL-SCNC: 110 MMOL/L (ref 98–107)
CO2 SERPL-SCNC: 24 MMOL/L (ref 21–32)
CREAT SERPL-MCNC: 0.6 MG/DL (ref 0.55–1.3)
DEPRECATED RDW RBC AUTO: 12.3 % (ref 11.6–15.6)
EOSINOPHIL # BLD: 2.7 % (ref 0–4.5)
GLUCOSE SERPL-MCNC: 88 MG/DL (ref 74–106)
HCT VFR BLD CALC: 38.3 % (ref 32.4–45.2)
HGB BLD-MCNC: 12.6 GM/DL (ref 10.7–15.3)
LYMPHOCYTES # BLD: 27.3 % (ref 8–40)
MCH RBC QN AUTO: 28.3 PG (ref 25.7–33.7)
MCHC RBC AUTO-ENTMCNC: 32.9 G/DL (ref 32–36)
MCV RBC: 85.8 FL (ref 80–96)
MONOCYTES # BLD AUTO: 5.5 % (ref 3.8–10.2)
NEUTROPHILS # BLD: 63.8 % (ref 42.8–82.8)
PLATELET # BLD AUTO: 266 K/MM3 (ref 134–434)
PMV BLD: 7.8 FL (ref 7.5–11.1)
POTASSIUM SERPLBLD-SCNC: 3.6 MMOL/L (ref 3.5–5.1)
PROT SERPL-MCNC: 6.7 G/DL (ref 6.4–8.2)
RBC # BLD AUTO: 4.46 M/MM3 (ref 3.6–5.2)
SODIUM SERPL-SCNC: 140 MMOL/L (ref 136–145)
WBC # BLD AUTO: 6.3 K/MM3 (ref 4–10)

## 2020-11-11 PROCEDURE — 0FT44ZZ RESECTION OF GALLBLADDER, PERCUTANEOUS ENDOSCOPIC APPROACH: ICD-10-PCS | Performed by: SURGERY

## 2020-11-11 RX ADMIN — ACETAMINOPHEN PRN MG: 325 TABLET ORAL at 18:32

## 2020-11-11 RX ADMIN — PIPERACILLIN SODIUM,TAZOBACTAM SODIUM SCH MLS/HR: 3; .375 INJECTION, POWDER, FOR SOLUTION INTRAVENOUS at 10:23

## 2020-11-11 RX ADMIN — SODIUM CHLORIDE, POTASSIUM CHLORIDE, SODIUM LACTATE AND CALCIUM CHLORIDE SCH MLS/HR: 600; 310; 30; 20 INJECTION, SOLUTION INTRAVENOUS at 18:27

## 2020-11-11 RX ADMIN — PIPERACILLIN SODIUM,TAZOBACTAM SODIUM SCH MLS/HR: 3; .375 INJECTION, POWDER, FOR SOLUTION INTRAVENOUS at 04:52

## 2020-11-12 VITALS — HEART RATE: 74 BPM | DIASTOLIC BLOOD PRESSURE: 88 MMHG | SYSTOLIC BLOOD PRESSURE: 131 MMHG | TEMPERATURE: 98.5 F

## 2020-11-12 LAB
ALBUMIN SERPL-MCNC: 3.3 G/DL (ref 3.4–5)
ALP SERPL-CCNC: 76 U/L (ref 45–117)
ALT SERPL-CCNC: 55 U/L (ref 13–61)
ANION GAP SERPL CALC-SCNC: 7 MMOL/L (ref 8–16)
AST SERPL-CCNC: 32 U/L (ref 15–37)
BILIRUB CONJ SERPL-MCNC: 0.1 MG/DL (ref 0–0.2)
BILIRUB SERPL-MCNC: 0.5 MG/DL (ref 0.2–1)
BUN SERPL-MCNC: 5 MG/DL (ref 7–18)
CALCIUM SERPL-MCNC: 8.7 MG/DL (ref 8.5–10.1)
CHLORIDE SERPL-SCNC: 109 MMOL/L (ref 98–107)
CO2 SERPL-SCNC: 26 MMOL/L (ref 21–32)
CREAT SERPL-MCNC: 0.6 MG/DL (ref 0.55–1.3)
DEPRECATED RDW RBC AUTO: 12 % (ref 11.6–15.6)
GLUCOSE SERPL-MCNC: 87 MG/DL (ref 74–106)
HCT VFR BLD CALC: 34.3 % (ref 32.4–45.2)
HGB BLD-MCNC: 11.5 GM/DL (ref 10.7–15.3)
MCH RBC QN AUTO: 28.3 PG (ref 25.7–33.7)
MCHC RBC AUTO-ENTMCNC: 33.6 G/DL (ref 32–36)
MCV RBC: 84.4 FL (ref 80–96)
PLATELET # BLD AUTO: 252 K/MM3 (ref 134–434)
PMV BLD: 7.9 FL (ref 7.5–11.1)
POTASSIUM SERPLBLD-SCNC: 4 MMOL/L (ref 3.5–5.1)
PROT SERPL-MCNC: 6.4 G/DL (ref 6.4–8.2)
RBC # BLD AUTO: 4.06 M/MM3 (ref 3.6–5.2)
SODIUM SERPL-SCNC: 142 MMOL/L (ref 136–145)
WBC # BLD AUTO: 10.1 K/MM3 (ref 4–10)

## 2020-11-12 RX ADMIN — ACETAMINOPHEN PRN MG: 325 TABLET ORAL at 07:09

## 2020-11-12 RX ADMIN — SODIUM CHLORIDE, POTASSIUM CHLORIDE, SODIUM LACTATE AND CALCIUM CHLORIDE SCH MLS/HR: 600; 310; 30; 20 INJECTION, SOLUTION INTRAVENOUS at 06:26

## 2021-05-03 ENCOUNTER — HOSPITAL ENCOUNTER (EMERGENCY)
Dept: HOSPITAL 74 - JERFT | Age: 41
Discharge: HOME | End: 2021-05-03
Payer: COMMERCIAL

## 2021-05-03 VITALS — BODY MASS INDEX: 28.3 KG/M2

## 2021-05-03 VITALS — HEART RATE: 76 BPM | SYSTOLIC BLOOD PRESSURE: 106 MMHG | TEMPERATURE: 97.8 F | DIASTOLIC BLOOD PRESSURE: 77 MMHG

## 2021-05-03 DIAGNOSIS — M25.511: Primary | ICD-10-CM

## 2021-05-03 PROCEDURE — 3E0233Z INTRODUCTION OF ANTI-INFLAMMATORY INTO MUSCLE, PERCUTANEOUS APPROACH: ICD-10-PCS

## 2021-05-27 ENCOUNTER — HOSPITAL ENCOUNTER (OUTPATIENT)
Dept: HOSPITAL 74 - FASU | Age: 41
Discharge: HOME | End: 2021-05-27
Attending: ORTHOPAEDIC SURGERY
Payer: COMMERCIAL

## 2021-05-27 VITALS — SYSTOLIC BLOOD PRESSURE: 134 MMHG | DIASTOLIC BLOOD PRESSURE: 90 MMHG | HEART RATE: 86 BPM

## 2021-05-27 VITALS — TEMPERATURE: 97.8 F

## 2021-05-27 VITALS — BODY MASS INDEX: 28.3 KG/M2
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
## 2021-05-27 DIAGNOSIS — M75.121: Primary | ICD-10-CM

## 2021-05-27 PROCEDURE — 29826 SHO ARTHRS SRG DECOMPRESSION: CPT

## 2021-05-27 PROCEDURE — 0RHJ44Z INSERTION OF INTERNAL FIXATION DEVICE INTO RIGHT SHOULDER JOINT, PERCUTANEOUS ENDOSCOPIC APPROACH: ICD-10-PCS | Performed by: ORTHOPAEDIC SURGERY

## 2021-05-27 PROCEDURE — 29827 SHO ARTHRS SRG RT8TR CUF RPR: CPT

## 2021-05-27 PROCEDURE — 0LQ14ZZ REPAIR RIGHT SHOULDER TENDON, PERCUTANEOUS ENDOSCOPIC APPROACH: ICD-10-PCS | Performed by: ORTHOPAEDIC SURGERY

## 2021-05-27 PROCEDURE — 0RNJ4ZZ RELEASE RIGHT SHOULDER JOINT, PERCUTANEOUS ENDOSCOPIC APPROACH: ICD-10-PCS | Performed by: ORTHOPAEDIC SURGERY

## 2025-02-13 ENCOUNTER — HOSPITAL ENCOUNTER (EMERGENCY)
Dept: HOSPITAL 74 - JERFT | Age: 45
Discharge: HOME | End: 2025-02-13
Payer: COMMERCIAL

## 2025-02-13 VITALS — BODY MASS INDEX: 28.7 KG/M2

## 2025-02-13 VITALS
TEMPERATURE: 97.8 F | DIASTOLIC BLOOD PRESSURE: 87 MMHG | SYSTOLIC BLOOD PRESSURE: 125 MMHG | RESPIRATION RATE: 18 BRPM | HEART RATE: 79 BPM

## 2025-02-13 DIAGNOSIS — M54.42: Primary | ICD-10-CM

## 2025-02-13 DIAGNOSIS — M79.605: ICD-10-CM

## 2025-02-13 RX ADMIN — ACETAMINOPHEN ONE MG: 500 TABLET, FILM COATED ORAL at 10:10

## 2025-02-13 RX ADMIN — METHOCARBAMOL ONE MG: 500 TABLET ORAL at 10:10

## 2025-02-13 RX ADMIN — IBUPROFEN ONE MG: 400 TABLET, FILM COATED ORAL at 10:09
